# Patient Record
(demographics unavailable — no encounter records)

---

## 2025-04-09 NOTE — IMAGING
[de-identified] : [facility] Inspection: No rash or ecchymosis Palpation: No tenderness to palpation or spasm in bilateral thoracic and lumbar paraspinal musculature, no SI joint tenderness to palpation ROM: Limited in all planes with discomfort Strength: 5/5 bilateral hip flexors, knee extensors, ankle dorsiflexors, EHL, ankle plantarflexors Sensation: Sensation present to light touch bilateral L2-S1 distributions Provocative maneuvers: Negative bilateral straight leg raise [Facet arthropathy] : Facet arthropathy [Disc space narrowing] : Disc space narrowing [Spondylolithesis] : Spondylolithesis [AP] : anteroposterior [Mild arthritis (Tonnis Grade 1)] : Mild arthritis (Tonnis Grade 1)

## 2025-04-09 NOTE — ASSESSMENT
[FreeTextEntry1] : PT, meds  Will discuss MRI  MDP  Muscle Relaxants- To help decrease muscle spasm and assist with pain relief. Advised of sedating effects and instructed not to drive, operate heavy machinery, or take with other sedating medications.

## 2025-04-09 NOTE — HISTORY OF PRESENT ILLNESS
[de-identified] : 4/9/25: 56 M is here for LBP that has been a chronic issue with recent worsening after cleaning and lifting heavy objects. This is his first evaluation. He has done nothing to treat it. Pain is worse with standing. Denies numbness, tingling, radiation, prior injury, bowel or bladder incontinence, saddle anesthesia. He works as a . Reports cramping in the calves when the A/C is on.   PMH: none.